# Patient Record
Sex: FEMALE | Race: WHITE
[De-identification: names, ages, dates, MRNs, and addresses within clinical notes are randomized per-mention and may not be internally consistent; named-entity substitution may affect disease eponyms.]

---

## 2017-12-08 ENCOUNTER — HOSPITAL ENCOUNTER (OUTPATIENT)
Dept: HOSPITAL 92 - SDC | Age: 57
Discharge: HOME | End: 2017-12-08
Attending: ORTHOPAEDIC SURGERY
Payer: COMMERCIAL

## 2017-12-08 VITALS — BODY MASS INDEX: 26.6 KG/M2

## 2017-12-08 DIAGNOSIS — E03.9: ICD-10-CM

## 2017-12-08 DIAGNOSIS — Z98.890: ICD-10-CM

## 2017-12-08 DIAGNOSIS — G56.01: Primary | ICD-10-CM

## 2017-12-08 DIAGNOSIS — M18.11: ICD-10-CM

## 2017-12-08 DIAGNOSIS — Z79.899: ICD-10-CM

## 2017-12-08 LAB
HCT VFR BLD CALC: 44 % (ref 36–47)
RBC # BLD AUTO: 4.65 MILL/UL (ref 4.2–5.4)
WBC # BLD AUTO: 5.6 THOU/UL (ref 4.8–10.8)

## 2017-12-08 PROCEDURE — 36415 COLL VENOUS BLD VENIPUNCTURE: CPT

## 2017-12-08 PROCEDURE — 85027 COMPLETE CBC AUTOMATED: CPT

## 2017-12-08 PROCEDURE — 3E0U33Z INTRODUCTION OF ANTI-INFLAMMATORY INTO JOINTS, PERCUTANEOUS APPROACH: ICD-10-PCS | Performed by: ORTHOPAEDIC SURGERY

## 2017-12-08 PROCEDURE — 01N50ZZ RELEASE MEDIAN NERVE, OPEN APPROACH: ICD-10-PCS | Performed by: ORTHOPAEDIC SURGERY

## 2017-12-08 NOTE — OP
DATE OF PROCEDURE:  12/08/2017

 

PREOPERATIVE DIAGNOSES:

1.  Right thumb carpometacarpal arthritis.

2.  Right carpal tunnel syndrome.

 

POST OPERATIVE DIAGNOSES:

1.  Right thumb carpometacarpal arthritis.

2.  Right carpal tunnel syndrome.

 

PROCEDURES PERFORMED:

1.  Right thumb carpometacarpal joint steroid injection.

2.  Right open carpal tunnel release.

3.  Placement of short arm volar splint, right upper extremity.

 

SURGEON:  José Miguel Alexander M.D.

 

ASSISTANT:  None.

 

BLOOD LOSS:  Minimal.

 

COMPLICATIONS:  None.

 

ANESTHESIA:  She had TIVA with local.

 

DISPOSITION:  She went to day stay in stable condition.

 

INDICATIONS:  A 57-year-old female who comes in with a long-term carpal tunnel syndrome and pain in h
er CMC joint of the right thumb.  At this time, she opted for surgery.

 

DESCRIPTION OF PROCEDURE:  After all appropriate consent forms were explained and signed, she was iván
en back to the operating room and at this time was given TIVA with local.  Tourniquet was placed on t
he right arm and the arm was prepped and draped in the standard surgical fashion.  At this time, a ne
edle was used to localize the right thumb CMC joint and once it was localized, 40 mg of Depo-Medrol w
as injected without complication.  We then turned our attention to the carpal tunnel release.  Incisi
on was drawn out and infiltrated with plain lidocaine.  Limb was exsanguinated and the tourniquet was
 taken up to 250 mmHg.  At this time, using Loupe magnification, a 15 blade was used to incise down t
hrough skin.  Bipolar cautery was used to coagulate any brisk venous bleeding.  We then placed a smal
l hemostat to protect the underlying median nerve and transected the transverse carpal ligament using
 multiple 15 blades as well as scissors.  Once this was done, a small finger was inserted to palpate 
for any remaining bands.  At this time, a moist Ray-Carolyn sponge was placed into the wound.  Tourniquet
 was let down and pressure was held.  Bipolar cautery used to coagulate any brisk venous bleeding.  T
he wound was then irrigated with saline solution and we then evaluated the nerve.  The nerve was foun
d to be significantly injected, the nerve was intact, there were no masses noted, and the underlying 
flexor tendons were in good condition.  At this time, we irrigated and dried the wound.  We then plac
ed multiple nylon stitches to close the incision.  A bulky sterile hand dressing and a small volar sp
lint were then made.  The patient was then awakened and taken to recovery in stable condition.  All c
ounts were correct at the end of the case.  The patient received preoperative IV antibiotics.

## 2018-02-28 ENCOUNTER — HOSPITAL ENCOUNTER (OUTPATIENT)
Dept: HOSPITAL 92 - BICMAMMO | Age: 58
Discharge: HOME | End: 2018-02-28
Attending: OBSTETRICS & GYNECOLOGY
Payer: COMMERCIAL

## 2018-02-28 DIAGNOSIS — Z12.31: Primary | ICD-10-CM

## 2018-02-28 DIAGNOSIS — Z80.3: ICD-10-CM

## 2018-02-28 PROCEDURE — 77067 SCR MAMMO BI INCL CAD: CPT

## 2018-02-28 PROCEDURE — 77063 BREAST TOMOSYNTHESIS BI: CPT

## 2020-06-01 ENCOUNTER — HOSPITAL ENCOUNTER (OUTPATIENT)
Dept: HOSPITAL 92 - LABBT | Age: 60
Discharge: HOME | End: 2020-06-01
Attending: ORTHOPAEDIC SURGERY
Payer: COMMERCIAL

## 2020-06-01 DIAGNOSIS — Z11.59: ICD-10-CM

## 2020-06-01 DIAGNOSIS — M18.11: ICD-10-CM

## 2020-06-01 DIAGNOSIS — Z01.818: Primary | ICD-10-CM

## 2020-06-01 LAB
ANION GAP SERPL CALC-SCNC: 11 MMOL/L (ref 10–20)
BASOPHILS # BLD AUTO: 0 THOU/UL (ref 0–0.2)
BASOPHILS NFR BLD AUTO: 0.7 % (ref 0–1)
BUN SERPL-MCNC: 12 MG/DL (ref 9.8–20.1)
CALCIUM SERPL-MCNC: 8.8 MG/DL (ref 7.8–10.44)
CHLORIDE SERPL-SCNC: 105 MMOL/L (ref 98–107)
CO2 SERPL-SCNC: 27 MMOL/L (ref 22–29)
CREAT CL PREDICTED SERPL C-G-VRATE: 0 ML/MIN (ref 70–130)
EOSINOPHIL # BLD AUTO: 0.1 THOU/UL (ref 0–0.7)
EOSINOPHIL NFR BLD AUTO: 1.2 % (ref 0–10)
GLUCOSE SERPL-MCNC: 128 MG/DL (ref 70–105)
HGB BLD-MCNC: 15.4 G/DL (ref 12–16)
LYMPHOCYTES # BLD: 2 THOU/UL (ref 1.2–3.4)
LYMPHOCYTES NFR BLD AUTO: 32.6 % (ref 21–51)
MCH RBC QN AUTO: 33.1 PG (ref 27–31)
MCV RBC AUTO: 95.2 FL (ref 78–98)
MONOCYTES # BLD AUTO: 0.5 THOU/UL (ref 0.11–0.59)
MONOCYTES NFR BLD AUTO: 8.4 % (ref 0–10)
NEUTROPHILS # BLD AUTO: 3.5 THOU/UL (ref 1.4–6.5)
NEUTROPHILS NFR BLD AUTO: 57.1 % (ref 42–75)
PLATELET # BLD AUTO: 280 THOU/UL (ref 130–400)
POTASSIUM SERPL-SCNC: 3.9 MMOL/L (ref 3.5–5.1)
RBC # BLD AUTO: 4.67 MILL/UL (ref 4.2–5.4)
RBC UR QL AUTO: (no result) HPF (ref 0–3)
SODIUM SERPL-SCNC: 139 MMOL/L (ref 136–145)
WBC # BLD AUTO: 6.2 THOU/UL (ref 4.8–10.8)
WBC UR QL AUTO: (no result) HPF (ref 0–3)

## 2020-06-01 PROCEDURE — 87635 SARS-COV-2 COVID-19 AMP PRB: CPT

## 2020-06-01 PROCEDURE — 93005 ELECTROCARDIOGRAM TRACING: CPT

## 2020-06-01 PROCEDURE — 85025 COMPLETE CBC W/AUTO DIFF WBC: CPT

## 2020-06-01 PROCEDURE — 93010 ELECTROCARDIOGRAM REPORT: CPT

## 2020-06-01 PROCEDURE — 80048 BASIC METABOLIC PNL TOTAL CA: CPT

## 2020-06-01 PROCEDURE — 81001 URINALYSIS AUTO W/SCOPE: CPT

## 2020-06-01 PROCEDURE — U0003 INFECTIOUS AGENT DETECTION BY NUCLEIC ACID (DNA OR RNA); SEVERE ACUTE RESPIRATORY SYNDROME CORONAVIRUS 2 (SARS-COV-2) (CORONAVIRUS DISEASE [COVID-19]), AMPLIFIED PROBE TECHNIQUE, MAKING USE OF HIGH THROUGHPUT TECHNOLOGIES AS DESCRIBED BY CMS-2020-01-R: HCPCS

## 2020-06-05 ENCOUNTER — HOSPITAL ENCOUNTER (OUTPATIENT)
Dept: HOSPITAL 92 - SDC | Age: 60
Discharge: HOME | End: 2020-06-05
Attending: ORTHOPAEDIC SURGERY
Payer: COMMERCIAL

## 2020-06-05 VITALS — BODY MASS INDEX: 28 KG/M2

## 2020-06-05 DIAGNOSIS — Z79.899: ICD-10-CM

## 2020-06-05 DIAGNOSIS — M18.11: Primary | ICD-10-CM

## 2020-06-05 DIAGNOSIS — M65.312: ICD-10-CM

## 2020-06-05 PROCEDURE — 76000 FLUOROSCOPY <1 HR PHYS/QHP: CPT

## 2020-06-05 PROCEDURE — S0028 INJECTION, FAMOTIDINE, 20 MG: HCPCS

## 2020-06-05 PROCEDURE — 0RRQ07Z REPLACEMENT OF RIGHT CARPAL JOINT WITH AUTOLOGOUS TISSUE SUBSTITUTE, OPEN APPROACH: ICD-10-PCS | Performed by: ORTHOPAEDIC SURGERY

## 2020-06-05 PROCEDURE — A4306 DRUG DELIVERY SYSTEM <=50 ML: HCPCS

## 2020-06-05 PROCEDURE — S0020 INJECTION, BUPIVICAINE HYDRO: HCPCS

## 2020-06-05 NOTE — RAD
Exam:

XR Finger(s) Rt Min 2 View



HISTORY:

Right thumb arthroplasty



COMPARISON:

6/1/2020



FINDINGS:

4 intraoperative fluoroscopic images of the right wrist and thumb are submitted.

Provided fluoroscopic images demonstrate metallic pin overlying the metacarpal phalangeal joint of th
e thumb. Images demonstrate removal of the trapezium bone, and there is a metallic pin overlying

the bases of the metacarpal of the thumb and index finger. Correlation with intraoperative findings i
s recommended.



Fluoroscopy:

Time-26 seconds

Dose 0.0431 mGy centimeter squared



Reported By: Henrique Cox 

Electronically Signed:  6/5/2020 10:43 AM

## 2020-06-08 NOTE — OP
DATE OF PROCEDURE:  06/05/2020



PREOPERATIVE DIAGNOSES:  

1. Right thumb carpometacarpal joint osteoarthritis, severe.

2. Right thumb metacarpophalangeal joint capsule laxity, volar.



FINDINGS:  Marked laxity with almost 60 degrees of hyperextension of MP joint and

CMC arthroplasty with a Z deformity showing over 70% wear at the base of the thumb

metacarpal and 80% wear of chondral surface at the trapezium. 



PROCEDURES PERFORMED:  

1. Complete trapeziectomy.

2. Right thumb carpometacarpal joint ligament replacement and tendon interposition.

3. Left thumb MP joint volar capsule repair.

4. C-arm supervision.



SPECIMEN REMOVED:  Trapezium removed, but not sent to lab.



TOURNIQUET TIME:  129 minutes.



ESTIMATED BLOOD LOSS:  20 mL.



DESCRIPTION OF PROCEDURE:  After successful general endotracheal anesthesia, the

limb was prepped and draped.  The patient then had time-out done appropriately.  We

then brought the C-arm to the field and identified the area, matched the side,

matched the site, matched the planned procedure, the joint, and the finger. 



We outlined a zigzag incision centered on the A1 pulley in the palm of the right

thumb and then we outlined a J-shaped approach to the CMC of the thumb for ligament

replacement and tendon interposition as well as two incisions for harvesting the

flexor carpi radialis.  We first approached the thumb metacarpophalangeal joint.  We

made a zigzag incision, carried through skin and subcutaneous tissue, protecting

both branches of this nerve to include the radial and then identified the A1 pulley.

 We released the A1 pulley.  We retracted the flexor tendon radially and were able

to visualize the capsule   __________.  We made a V-shaped capsular reefing incision

and then oversewed the capsule in a pants-over-vest using multiple 4-0 Prolene with

two on each side of the incision proximally and on top of the distal end. Once this

was in place, but not tied, the joint was flexed 30 degrees and then pinned with a

K-wire under C-arm supervision.  We had excellent fixation of the joint centered in

the frontal sagittal plane and at 30 degrees sagittal plane.  We then cut the wires

below the skin and then we tied the sutures in a flexed MP joint position.

Tourniquet was released, and we obtained hemostasis.  We closed the skin with

interrupted 4-0 nylon mattress pattern. 



We left a running tourniquet time and then we began the primary incision.  This was

carried through skin and subcutaneous tissue.  We identified the neurovascular

bundles, protected radial neurovascular bundle in excellent fashion.  We then

released.  We then entered the point of base of thumb between the abductor pollicis

and the extensor pollicis brevis, found the joint capsule and released it over the

CMC joint and then tagged it with two separate 2-0 Vicryls.  We used this to help

retract it.  We now dissected the capsule off the trapezium and exposed the entire

carpometacarpal joint of the thumb.  Then, released the scaphotrapezial capsule, we

then placed under C-arm confirmation a 0.062 K-wire that was threaded in the

trapezium, but not in the trapezoid. This was used to help elevate trapezium and we

dissected the capsule volarly and ulnarly from the flexor carpi radialis sparing the

tendon.  We then lifted the remainder of the capsule off the flexor carpi radialis

sparing the tendon, and now, we were able to dissect the trapezium free and also

protected the radial artery.  We lifted trapezium from the wound. 



We removed all osteophytes from the thumb base.  The thumb base was quite dysplastic

with an elevated ulnar side chondral surface of the thumb versus the elongated

radial side.  We then found the position 1.5 cm from the radial aspect of the thumb

metacarpal base, removed osteophytes, and then from here, we were able to retract

all the digital nerves, protect the extensor pollicis brevis, and then drilled a 2.5

mm hole from this lateral wall into the junction of the chondral surface and the

metaphysis at the ulnar aspect of the thumb metacarpal.  We then expanded to 3.5 mm.

 At all times, we protected the FCR. 



We then began to harvest the FCR using two previous outlined incisions each one

approximately 6 cm from the other and the first one being 7 cm from the palmar

flexion crease.  We carried this proximal until we identified the tendon in all

three incisions, released it from the musculotendinous junction.  We removed excess

muscle.  Then, we slid it all the way into the wrist where we then trimmed it

__________We used a curved __________ grabbed the tendon, brought it from inside the

defect out to the lateral wall.  Once we brought the tendon through the lateral

wall, we then placed the metacarpal in anatomic position frontal sagittal plane and

K-wired through the index finger, thumb metacarpal to the index finger metacarpal,

and this had excellent clinical radiograph position.  We then passed the tendon

underneath the abductor, secured it to the lateral wall x3 with 4-0 interrupted

Prolene into the abductor pollicis longus x2 with interrupted 4-0 Prolene.  We then

secured it to the volar capsule, where we had previously placed a 3-0 Prolene deep

in the volar capsule for the final portion of the anchovy procedure.  Now that the

wire was in place, we cut it and then brought it out through a separate stab wound

in the skin off the suture line by 5 mm.  We then took the remaining portion of the

flexor carpi radialis tendon, performed the anchovy weave with the heavy 3-0 Prolene

and tied this down deep in the defect created by the trapeziectomy.  There were no

STT joint changes. 



Once this was done, we released the tourniquet and we were able to obtain

hemostasis.  We closed the capsule with the 2-0 Vicryl.  We used 2-0 Vicryl to

reapproximate the thenar muscle back to the capsule and abductor pollicis.  We then

obtained hemostasis at this site and closed the skin with the wire cut just below

the skin with interrupted 4-0 nylon in a mattress pattern. 



Combination of 4-0 Monocryl and 4-0 nylon was used to close the harvest incision for

the flexor carpi radialis tendon.  Now the 2 K-wires were below the skin, bulky

dressing was applied with Bacitracin, Adaptic, 4x4, and the patient had the wound

covered with a thumb spica splint to the level of the mid forearm without

complication. 







Job ID:  [id number]